# Patient Record
(demographics unavailable — no encounter records)

---

## 2024-11-14 NOTE — HISTORY OF PRESENT ILLNESS
[de-identified] : WEIGHT CHECK [FreeTextEntry6] : maintaining weight - no longer vomiting under the care of [facility]d pain r/o  AP 9/22 CT showed inflammation but no acute AP Pt pain improved and was d/c home diet is healthy - eating 3 meals per day happy and active

## 2025-05-31 NOTE — HISTORY OF PRESENT ILLNESS
[de-identified] : chills/aches [FreeTextEntry6] : c/o chills and body aches cant taste anything very tired recent travel to [place] sx approx 1 sk temp 99

## 2025-05-31 NOTE — DISCUSSION/SUMMARY
[FreeTextEntry1] : RAPID COVID POS  Positive Results:  means the virus was found in the nasal passages and you are infected with the COVID-19 virus. Per CDC guidelines 1- Self isolate at home, except to get medical care - call 911 in case of emergency 2- Monitor your symptoms and if you have any of these emergency warning signs - get medical attention immediately:  Trouble breathing Persistent cough, pain or pressure in chest New confusion, lethargy Blue lips or face  RAPID FLU NEG RAPID STREP NEG TC SENT TO LAB  Viral syndrome most likely. Physiologic nature of fever explained. Reviewed proper doses of acetaminophen and ibuprofen. Provided with guidance as to when to call or return to office.  RTO PRN advised on signs and symptoms requiring re evaluation.